# Patient Record
Sex: MALE | Race: BLACK OR AFRICAN AMERICAN | URBAN - METROPOLITAN AREA
[De-identification: names, ages, dates, MRNs, and addresses within clinical notes are randomized per-mention and may not be internally consistent; named-entity substitution may affect disease eponyms.]

---

## 2024-05-13 ENCOUNTER — HOSPITAL ENCOUNTER (EMERGENCY)
Age: 19
Discharge: HOME | End: 2024-05-13
Attending: EMERGENCY MEDICINE

## 2024-05-13 VITALS
BODY MASS INDEX: 18.27 KG/M2 | TEMPERATURE: 98.3 F | DIASTOLIC BLOOD PRESSURE: 65 MMHG | RESPIRATION RATE: 16 BRPM | WEIGHT: 150 LBS | HEART RATE: 95 BPM | HEIGHT: 76 IN | SYSTOLIC BLOOD PRESSURE: 121 MMHG | OXYGEN SATURATION: 99 %

## 2024-05-13 DIAGNOSIS — T16.2XXA FOREIGN BODY OF LEFT EAR, INITIAL ENCOUNTER: Primary | ICD-10-CM

## 2024-05-13 PROCEDURE — 6370000000 HC RX 637 (ALT 250 FOR IP): Performed by: EMERGENCY MEDICINE

## 2024-05-13 PROCEDURE — 99283 EMERGENCY DEPT VISIT LOW MDM: CPT

## 2024-05-13 RX ORDER — LIDOCAINE HYDROCHLORIDE 20 MG/ML
10 SOLUTION OROPHARYNGEAL
Status: COMPLETED | OUTPATIENT
Start: 2024-05-13 | End: 2024-05-13

## 2024-05-13 RX ADMIN — LIDOCAINE HYDROCHLORIDE 10 ML: 20 SOLUTION ORAL at 02:54

## 2024-05-13 ASSESSMENT — LIFESTYLE VARIABLES
HOW OFTEN DO YOU HAVE A DRINK CONTAINING ALCOHOL: NEVER
HOW MANY STANDARD DRINKS CONTAINING ALCOHOL DO YOU HAVE ON A TYPICAL DAY: PATIENT DOES NOT DRINK

## 2024-05-13 ASSESSMENT — PAIN - FUNCTIONAL ASSESSMENT: PAIN_FUNCTIONAL_ASSESSMENT: NONE - DENIES PAIN

## 2024-05-13 NOTE — ED PROVIDER NOTES
Emergency Department Provider Note       PCP: No, Pcp   Age: 18 y.o.   Sex: male     DISPOSITION Eloped - Left Before Treatment Complete 05/13/2024 05:25:34 AM       ICD-10-CM    1. Foreign body of left ear, initial encounter  T16.2XXA           Medical Decision Making     18-year-old male presents with foreign body/insect in the left ear.  After multiple attempts, was unable to remove the full insect.  Recommended referral to ENT, please see note below.  Mother and patient decided to leave without any referral or discussion.  ED Course as of 05/13/24 0719   Mon May 13, 2024   0521 After multiple attempts, only able to remove part of the insect from the ear canal.  Due to the length of the canal, limited visibility with the tools available, and concern over injury to the eardrum with attempts to be much more aggressive, recommended to mother we consult ENT.  When I told her they may wait until office hours to see the patient, she wanted to be transferred downtown where they had the more extensive ENT equipment.  Mother is very frustrated, thinks that because the dog is touching the eardrum the risk of infection grows with every minute.  Despite assuring the mother that the inert insect is unlikely to cause significant infection in the first 24 hours, she remains adamant that it has to come out now.  Further attempts at irrigation were also unsuccessful. [BB]      ED Course User Index  [BB] Aaron Gaston MD     1 acute, uncomplicated illness or injury.      I independently ordered and reviewed each unique test.                     History     18-year-old male complains of a bug flying into his left ear, and continuing to feel it move around in his ear.    The history is provided by the patient.     Physical Exam     Vitals signs and nursing note reviewed:  Vitals:    05/13/24 0237   BP: 121/65   Pulse: 95   Resp: 16   Temp: 98.3 °F (36.8 °C)   TempSrc: Oral   SpO2: 99%   Weight: 68 kg (150 lb)   Height: 1.93 m